# Patient Record
Sex: FEMALE | Race: WHITE | HISPANIC OR LATINO
[De-identification: names, ages, dates, MRNs, and addresses within clinical notes are randomized per-mention and may not be internally consistent; named-entity substitution may affect disease eponyms.]

---

## 2023-03-28 ENCOUNTER — NON-APPOINTMENT (OUTPATIENT)
Age: 63
End: 2023-03-28

## 2023-03-29 PROBLEM — Z00.00 ENCOUNTER FOR PREVENTIVE HEALTH EXAMINATION: Status: ACTIVE | Noted: 2023-03-29

## 2023-03-30 ENCOUNTER — APPOINTMENT (OUTPATIENT)
Dept: SURGERY | Facility: CLINIC | Age: 63
End: 2023-03-30
Payer: MEDICAID

## 2023-03-30 VITALS
DIASTOLIC BLOOD PRESSURE: 84 MMHG | WEIGHT: 140 LBS | HEART RATE: 96 BPM | HEIGHT: 62 IN | SYSTOLIC BLOOD PRESSURE: 132 MMHG | BODY MASS INDEX: 25.76 KG/M2

## 2023-03-30 DIAGNOSIS — Z78.9 OTHER SPECIFIED HEALTH STATUS: ICD-10-CM

## 2023-03-30 DIAGNOSIS — K29.70 GASTRITIS, UNSPECIFIED, W/OUT BLEEDING: ICD-10-CM

## 2023-03-30 DIAGNOSIS — Z82.5 FAMILY HISTORY OF ASTHMA AND OTHER CHRONIC LOWER RESPIRATORY DISEASES: ICD-10-CM

## 2023-03-30 DIAGNOSIS — Z56.0 UNEMPLOYMENT, UNSPECIFIED: ICD-10-CM

## 2023-03-30 DIAGNOSIS — I10 ESSENTIAL (PRIMARY) HYPERTENSION: ICD-10-CM

## 2023-03-30 DIAGNOSIS — Z82.49 FAMILY HISTORY OF ISCHEMIC HEART DISEASE AND OTHER DISEASES OF THE CIRCULATORY SYSTEM: ICD-10-CM

## 2023-03-30 PROCEDURE — 99203 OFFICE O/P NEW LOW 30 MIN: CPT

## 2023-03-30 RX ORDER — LOSARTAN POTASSIUM 100 MG/1
TABLET, FILM COATED ORAL
Refills: 0 | Status: ACTIVE | COMMUNITY

## 2023-03-30 RX ORDER — OMEPRAZOLE 40 MG/1
CAPSULE, DELAYED RELEASE ORAL
Refills: 0 | Status: ACTIVE | COMMUNITY

## 2023-03-30 SDOH — ECONOMIC STABILITY - INCOME SECURITY: UNEMPLOYMENT, UNSPECIFIED: Z56.0

## 2023-03-30 NOTE — HISTORY OF PRESENT ILLNESS
[de-identified] : Patient is a 63 year -old female  who was referred by ORALIA Rees with the chief complaint of having a Right  breast mass  . She  denies any trauma and She has no nipple discharge. She  denies any fever, night sweats or loss of appetite.    There is no family history of breast carcinoma.   Menarche at age 14 -2 para-2 and her last menstrual period was before her hysterectomy in her 50s. Benign Patient is on no hormonal replacement therapy.  \par  Patient  had a  BL mammogram and US  on 2023 that was deemed a BIRADS 4 .   \par patient did not have breast imaging last year

## 2023-03-30 NOTE — PLAN
[FreeTextEntry1] : Ms. BOYD  is presenting  today for an evaluation .  she is doing well and offers no complaints.  Results of  her recent  imaging and physical examination findings were discussed in details.   She  was advised to have   Right     breast US  guided biopsy  and return after the tests. Importance of monthly self-breast examination was reinforced.  Patient's questions and concerns addressed to patient's satisfaction.\par \par Vitamin E daily for breast pain \par Avoid caffein and Chocolates to prevent breast pain

## 2023-03-30 NOTE — CONSULT LETTER
[Dear  ___] : Dear  [unfilled], [Please see my note below.] : Please see my note below. [Consult Closing:] : Thank you very much for allowing me to participate in the care of this patient.  If you have any questions, please do not hesitate to contact me. [Sincerely,] : Sincerely, [FreeTextEntry3] : Clark Silver MD, FACS

## 2023-03-30 NOTE — PHYSICAL EXAM
[Alert] : alert [Oriented to Person] : oriented to person [Oriented to Place] : oriented to place [Oriented to Time] : oriented to time [Calm] : calm [de-identified] : She  is alert, well-groomed, and in NAD\par   [de-identified] : anicteric.  Nasal mucosa pink, septum midline. Oral mucosa pink.  Tongue midline, Pharynx without exudates.\par   [de-identified] : Neck supple. Trachea midline. Thyroid isthmus barely palpable, lobes not felt.\par   [de-identified] : No chest deformity. Breast are symmetric, Normal contours. No nodules, masses, tenderness, or axillary or supraclavicular  adenopathy. No nipple discharge. no skin retraction \par

## 2023-04-20 ENCOUNTER — APPOINTMENT (OUTPATIENT)
Dept: SURGERY | Facility: CLINIC | Age: 63
End: 2023-04-20
Payer: MEDICAID

## 2023-04-20 VITALS
HEIGHT: 62 IN | SYSTOLIC BLOOD PRESSURE: 150 MMHG | BODY MASS INDEX: 25.76 KG/M2 | WEIGHT: 140 LBS | HEART RATE: 80 BPM | DIASTOLIC BLOOD PRESSURE: 87 MMHG

## 2023-04-20 PROCEDURE — 99213 OFFICE O/P EST LOW 20 MIN: CPT

## 2023-04-20 NOTE — DATA REVIEWED
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 \par \par A six-month follow up ultrasound is recommended. \par \par \par \par Thank you for the opportunity to participate in the care of this patient.  \par  \par Jany Lawrence MD  - Electronically Signed: 04- 12:52 PM \par Physician to Physician Direct Line is: [phone]\par Original Report\par EXAM: ULTRASOUND-GUIDED CORE BIOPSY 1 SITE\par \par HISTORY: Ultrasound-guided biopsy of a complicated cyst at right 10-11 o'clock 0 cm from the nipple. \par \par COMPARISON: Prior breast imaging studies dated 3/4/23\par \par PROCEDURE: Targeted ultrasound performed prior to the procedure reconfirms the finding in the right breast. \par \par The risks and benefits of the procedure were conveyed to the patient, and the patient consented to the procedure. Universal timeout was performed.\par \par Using sterile technique, 5 mL of 1% lidocaine was administered. An introducer needle was placed prior to insertion of the biopsy needle. Under sonographic visualization, a 14-gauge Jybe Marquee spring-loaded core biopsy device was used to sample the target. Multiple samples were obtained. A cylinder shaped biopsy clip was deployed at the biopsy site. \par \par A postprocedure mammogram demonstrates appropriate placement of the clip. \par \par The patient tolerated the procedure well without complications. The patient was given postbiopsy care instructions. The specimen was subsequently sent to the pathology lab. \par \par IMPRESSION: 1 site ultrasound-guided core biopsy was performed. \par \par Pathology results and concordance will follow as an addendum to this report. \par \par Thank you for the opportunity to participate in the care of this patient.  \par  \par Jany Lawrence MD  - Electronically Signed: 04- 12:38 PM \par Physician to Physician Direct Line is: [phone]

## 2023-04-20 NOTE — HISTORY OF PRESENT ILLNESS
[de-identified] : This is  a 63 year   old patient presenting today for a breast exam and to discuss the results of her recent  breast biopsy.  Patient had a BL mammogram and US on 2023 that was deemed a BIRADS 4.  Ms. BOYD  is s/p US guided right breast biopsy on 04/10/2023. 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 The pathology results are concordant with the imaging. Today  Ms. BOYD offers no complaints. patient reports no fever, chills,  or  pain.  Her biopsy site  is healing well.    \par  \par \par PERTINENT HISTORY: \par There is no family history of breast carcinoma. Menarche at age 14 -2 para-2 and her last menstrual period was before her hysterectomy in her 50s. Benign Patient is on no hormonal replacement therapy. \par  [de-identified] :  Patient reports no interval changes to her overall health status or medical history

## 2023-04-20 NOTE — PLAN
[FreeTextEntry1] : patient will follow up in 3 months for breast exam or if needed. Warning signs, follow up, and restrictions were discussed with the patient.

## 2023-04-20 NOTE — PHYSICAL EXAM
[Alert] : alert [Oriented to Person] : oriented to person [Oriented to Place] : oriented to place [Oriented to Time] : oriented to time [Calm] : calm [de-identified] : She  is alert, well-groomed, and in NAD\par   [de-identified] : anicteric.  Nasal mucosa pink, septum midline. Oral mucosa pink.  Tongue midline, Pharynx without exudates.\par   [de-identified] : Neck supple. Trachea midline. Thyroid isthmus barely palpable, lobes not felt.\par   [de-identified] : No chest deformity. Breast are symmetric, Normal contours. No nodules, masses, tenderness, or axillary or supraclavicular  adenopathy. No nipple discharge. no skin retraction \par

## 2023-08-23 ENCOUNTER — NON-APPOINTMENT (OUTPATIENT)
Age: 63
End: 2023-08-23

## 2023-08-23 DIAGNOSIS — N63.10 UNSPECIFIED LUMP IN THE RIGHT BREAST, UNSPECIFIED QUADRANT: ICD-10-CM

## 2023-09-07 ENCOUNTER — APPOINTMENT (OUTPATIENT)
Dept: SURGERY | Facility: CLINIC | Age: 63
End: 2023-09-07
Payer: MEDICAID

## 2023-09-07 VITALS
DIASTOLIC BLOOD PRESSURE: 89 MMHG | WEIGHT: 141 LBS | HEART RATE: 92 BPM | OXYGEN SATURATION: 97 % | HEIGHT: 62 IN | BODY MASS INDEX: 25.95 KG/M2 | SYSTOLIC BLOOD PRESSURE: 142 MMHG

## 2023-09-07 PROCEDURE — 99213 OFFICE O/P EST LOW 20 MIN: CPT

## 2023-09-07 NOTE — HISTORY OF PRESENT ILLNESS
[de-identified] : This is a 63 year  old patient who was referred by Dr. Kirk Dai  with the chief complaint of having  right sub clavicular mass.  She reports having this condition for 2 months   She denies any trauma to the area.   She denies any fever or  night sweats. Appetite is good and weight is stable.  She states that recently the mass started to  get  bigger and  more symptomatic. She wants to know if it could  be surgically  removed.    PERTINENT HISTORY  Patient had a BL mammogram and US on 2023 that was deemed a BIRADS 4.  Ms. BOYD  is s/p US guided right breast biopsy on 04/10/2023. 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 The pathology results are concordant with the imaging.   There is no family history of breast carcinoma. Menarche at age 14 -2 para-2 and her last menstrual period was before her hysterectomy in her 50s. Benign Patient is on no hormonal replacement therapy.

## 2023-09-07 NOTE — PHYSICAL EXAM
[Alert] : alert [Oriented to Person] : oriented to person [Oriented to Place] : oriented to place [Oriented to Time] : oriented to time [Calm] : calm [de-identified] : She  is alert, well-groomed, and in NAD\par    [de-identified] : anicteric.  Nasal mucosa pink, septum midline. Oral mucosa pink.  Tongue midline, Pharynx without exudates.\par    [de-identified] : Neck supple. Trachea midline. Thyroid isthmus barely palpable, lobes not felt.\par    [de-identified] : No chest deformity. Breast are symmetric, Normal contours. No nodules, masses, tenderness, or axillary or supraclavicular  adenopathy. No nipple discharge. no skin retraction [de-identified] : right sub clavicular mass is  mobile, soft ,   Smooth, non-tender,   Well defined.  deep. No palpable lymph nodes.   Mass size - 2  cm x 2  cm.

## 2023-09-07 NOTE — ASSESSMENT
[FreeTextEntry1] : Impression: right breast mass- benign Bx results  right sub clavicular mass - most likely a lipoma

## 2023-09-07 NOTE — PLAN
[FreeTextEntry1] : Ms. BOYD  is presenting  today for an evaluation .  she is doing well and offers no complaints.  Results of  her   physical examination findings were discussed in details.    She  was advised to have  Right     breast US   in  October  and return after the tests. Importance of monthly self-breast examination was reinforced.  Patient's questions and concerns addressed to patient's satisfaction.      Vitamin E daily for breast pain.   Avoid caffein and Chocolates to prevent breast pain NSAIDs PRN for pain

## 2023-10-25 ENCOUNTER — NON-APPOINTMENT (OUTPATIENT)
Age: 63
End: 2023-10-25

## 2023-11-09 ENCOUNTER — APPOINTMENT (OUTPATIENT)
Dept: SURGERY | Facility: CLINIC | Age: 63
End: 2023-11-09
Payer: MEDICAID

## 2023-11-09 VITALS
HEIGHT: 62 IN | HEART RATE: 67 BPM | DIASTOLIC BLOOD PRESSURE: 84 MMHG | WEIGHT: 140 LBS | BODY MASS INDEX: 25.76 KG/M2 | SYSTOLIC BLOOD PRESSURE: 143 MMHG | OXYGEN SATURATION: 97 %

## 2023-11-09 DIAGNOSIS — M79.89 OTHER SPECIFIED SOFT TISSUE DISORDERS: ICD-10-CM

## 2023-11-09 PROCEDURE — 99213 OFFICE O/P EST LOW 20 MIN: CPT
